# Patient Record
Sex: FEMALE | Race: WHITE | URBAN - METROPOLITAN AREA
[De-identification: names, ages, dates, MRNs, and addresses within clinical notes are randomized per-mention and may not be internally consistent; named-entity substitution may affect disease eponyms.]

---

## 2018-08-23 ENCOUNTER — OFFICE VISIT (OUTPATIENT)
Dept: URBAN - METROPOLITAN AREA CLINIC 11 | Facility: CLINIC | Age: 83
End: 2018-08-23
Payer: COMMERCIAL

## 2018-08-23 PROCEDURE — 92014 COMPRE OPH EXAM EST PT 1/>: CPT | Performed by: OPTOMETRIST

## 2018-08-23 ASSESSMENT — INTRAOCULAR PRESSURE
OS: 16
OD: 16

## 2018-08-23 NOTE — IMPRESSION/PLAN
Impression: Type 2 diabetes mellitus w/o complication: Z34.1. Plan: No signs of retinopathy or neovascularization noted. Discussed ocular and systemic benefits of blood sugar control.  RTC 1yr complete exam

## 2019-09-05 ENCOUNTER — OFFICE VISIT (OUTPATIENT)
Dept: URBAN - METROPOLITAN AREA CLINIC 11 | Facility: CLINIC | Age: 84
End: 2019-09-05
Payer: COMMERCIAL

## 2019-09-05 PROCEDURE — 92014 COMPRE OPH EXAM EST PT 1/>: CPT | Performed by: OPTOMETRIST

## 2019-09-05 ASSESSMENT — INTRAOCULAR PRESSURE
OD: 14
OS: 17

## 2019-09-05 NOTE — IMPRESSION/PLAN
Impression: Type 2 diabetes mellitus w/o complication: I91.1. Plan: No signs of retinopathy or neovascularization noted. Discussed ocular and systemic benefits of blood sugar control.  RTC 1yr complete exam

## 2021-02-24 ENCOUNTER — OFFICE VISIT (OUTPATIENT)
Dept: URBAN - METROPOLITAN AREA CLINIC 11 | Facility: CLINIC | Age: 86
End: 2021-02-24
Payer: COMMERCIAL

## 2021-02-24 DIAGNOSIS — E11.9 TYPE 2 DIABETES MELLITUS W/O COMPLICATION: Primary | ICD-10-CM

## 2021-02-24 DIAGNOSIS — H04.123 DRY EYE SYNDROME OF BILATERAL LACRIMAL GLANDS: ICD-10-CM

## 2021-02-24 PROCEDURE — 92014 COMPRE OPH EXAM EST PT 1/>: CPT | Performed by: OPTOMETRIST

## 2021-02-24 ASSESSMENT — INTRAOCULAR PRESSURE
OD: 14
OS: 16

## 2021-02-24 NOTE — IMPRESSION/PLAN
Impression: Type 2 diabetes mellitus w/o complication: K65.1. Plan: Fundus photos performed today ou. No DME. No signs of neovascularization noted. Discussed ocular and systemic benefits of blood sugar control. Continue blood sugar control.  RTC 1yr complete exam